# Patient Record
Sex: MALE | Race: WHITE | NOT HISPANIC OR LATINO
[De-identification: names, ages, dates, MRNs, and addresses within clinical notes are randomized per-mention and may not be internally consistent; named-entity substitution may affect disease eponyms.]

---

## 2018-04-14 ENCOUNTER — TRANSCRIPTION ENCOUNTER (OUTPATIENT)
Age: 68
End: 2018-04-14

## 2018-04-25 PROBLEM — Z00.00 ENCOUNTER FOR PREVENTIVE HEALTH EXAMINATION: Status: ACTIVE | Noted: 2018-04-25

## 2018-05-25 ENCOUNTER — APPOINTMENT (OUTPATIENT)
Dept: UROLOGY | Facility: CLINIC | Age: 68
End: 2018-05-25

## 2020-07-04 PROBLEM — F32.9 DEPRESSION: Status: ACTIVE | Noted: 2020-07-04

## 2020-07-06 ENCOUNTER — APPOINTMENT (OUTPATIENT)
Dept: HEART AND VASCULAR | Facility: CLINIC | Age: 70
End: 2020-07-06
Payer: MEDICARE

## 2020-07-06 ENCOUNTER — NON-APPOINTMENT (OUTPATIENT)
Age: 70
End: 2020-07-06

## 2020-07-06 VITALS
SYSTOLIC BLOOD PRESSURE: 144 MMHG | TEMPERATURE: 98.3 F | WEIGHT: 201 LBS | BODY MASS INDEX: 31.18 KG/M2 | HEART RATE: 71 BPM | OXYGEN SATURATION: 95 % | HEIGHT: 67.5 IN | DIASTOLIC BLOOD PRESSURE: 88 MMHG

## 2020-07-06 VITALS — DIASTOLIC BLOOD PRESSURE: 74 MMHG | SYSTOLIC BLOOD PRESSURE: 140 MMHG

## 2020-07-06 DIAGNOSIS — Z87.891 PERSONAL HISTORY OF NICOTINE DEPENDENCE: ICD-10-CM

## 2020-07-06 DIAGNOSIS — Z78.9 OTHER SPECIFIED HEALTH STATUS: ICD-10-CM

## 2020-07-06 DIAGNOSIS — F32.9 MAJOR DEPRESSIVE DISORDER, SINGLE EPISODE, UNSPECIFIED: ICD-10-CM

## 2020-07-06 PROCEDURE — 99205 OFFICE O/P NEW HI 60 MIN: CPT | Mod: 25

## 2020-07-06 PROCEDURE — 93000 ELECTROCARDIOGRAM COMPLETE: CPT

## 2020-07-06 RX ORDER — METOPROLOL SUCCINATE 25 MG/1
25 TABLET, EXTENDED RELEASE ORAL DAILY
Refills: 0 | Status: ACTIVE | COMMUNITY

## 2020-07-06 RX ORDER — LOSARTAN POTASSIUM 25 MG/1
25 TABLET, FILM COATED ORAL DAILY
Qty: 90 | Refills: 0 | Status: ACTIVE | COMMUNITY

## 2020-07-06 RX ORDER — FUROSEMIDE 40 MG/1
40 TABLET ORAL
Qty: 180 | Refills: 3 | Status: ACTIVE | COMMUNITY

## 2020-07-06 RX ORDER — GABAPENTIN 300 MG/1
300 CAPSULE ORAL 3 TIMES DAILY
Refills: 0 | Status: ACTIVE | COMMUNITY
Start: 2020-07-06

## 2020-07-06 RX ORDER — AMIODARONE HYDROCHLORIDE 200 MG/1
200 TABLET ORAL DAILY
Qty: 30 | Refills: 2 | Status: ACTIVE | COMMUNITY

## 2020-07-06 RX ORDER — APIXABAN 5 MG/1
5 TABLET, FILM COATED ORAL
Qty: 180 | Refills: 3 | Status: ACTIVE | COMMUNITY

## 2020-07-06 NOTE — HISTORY OF PRESENT ILLNESS
[FreeTextEntry1] : Mr. Norton presents for initial evaluation and management of atrial fibrillation, tachycardia induced cardiomyopathy, chronic systolic heart failure, ascending aortic aneurysm, and HTN.  He is being followed by Herrera Oliver MD and Jefe Love MD. He has a history of paroxysmal atrial fibrillation which is asymptomatic.  He has chronic systolic heart failure which is believed to be tachycardia mediated but he is not able to recall a coronary work up being done.  He presents for a second opinion regarding atrial fibrillation ablation.  He has low medical literacy and we spent a fair amount of time reviewing atrial fibrillation and cardiomyopathy.  \par \par \par

## 2020-07-06 NOTE — REVIEW OF SYSTEMS
[Negative] : Heme/Lymph [Joint Swelling] : joint swelling [Joint Pain] : joint pain [Palpitations] : palpitations [Dizziness] : dizziness [Joint Stiffness] : joint stiffness

## 2020-07-06 NOTE — ASSESSMENT
[FreeTextEntry1] : 1. Atrial fibrillation: [id number] Score 3\par     - continue systemic anticoagulation with Eliquis 5mg po bid\par     - discussed with patient avoidance of ASA and NSAIDS as well as need for bleeding surveillance.\par     \par 2. Chronic systolic heart failure ? secondary to tachycardia-induced cardiomyopathy:\par     - continue amiodarone 200mg po QD (currently in sinus rhythm)\par     - consider atrial fibrillation ablation after work up for ischemic heart disease\par     - consider changing losartan to an HF approved ARB\par     - continue Toprol XL 25mg po QD\par     - will send for a CTCA to r/o CAD\par \par 3. Ascending aorta aneurysm: 5.0cm\par    - continue beta 1 selective beta blocker, Toprol XL 25mg po QD\par    - will follow with serial imaging \par    - will send for a chest CTA

## 2020-07-06 NOTE — REASON FOR VISIT
[Initial Evaluation] : an initial evaluation of [FreeTextEntry1] : Diagnostic Tests:\par --------------------------------\par ECG:\par 07/06/20: NSR, normal ECG.  \par 05/13/20: sinus bradycardia at 58, otherwise normal.  \par -------------------------------\par Echo:\par 03/11/20: EF 40%, grade I diastolic dysfunction, dilated LA, mild MR, mild AI, ascending aorta 5cm.\par -------------------------------\par Lower extremity arteries:\par 01/10/20: sono: right mild, left peroneal 20-49%.\par -------------------------------\par Lower extremity veins:\par 10/14/19: sono: no DVT b/l, competent superficial system b/l.

## 2020-07-06 NOTE — PHYSICAL EXAM
[General Appearance - Well Developed] : well developed [General Appearance - Well Nourished] : well nourished [Normal Appearance] : normal appearance [Well Groomed] : well groomed [No Deformities] : no deformities [Normal Conjunctiva] : the conjunctiva exhibited no abnormalities [General Appearance - In No Acute Distress] : no acute distress [Eyelids - No Xanthelasma] : the eyelids demonstrated no xanthelasmas [No Oral Pallor] : no oral pallor [Normal Oral Mucosa] : normal oral mucosa [No Oral Cyanosis] : no oral cyanosis [Normal Jugular Venous V Waves Present] : normal jugular venous V waves present [Normal Jugular Venous A Waves Present] : normal jugular venous A waves present [No Jugular Venous Boykin A Waves] : no jugular venous boykin A waves [Normal Rate] : normal [Normal S2] : normal S2 [Normal S1] : normal S1 [No Murmur] : no murmurs heard [2+] : right 2+ [No Pitting Edema] : no pitting edema present [No Abnormalities] : the abdominal aorta was not enlarged and no bruit was heard [Respiration, Rhythm And Depth] : normal respiratory rhythm and effort [Exaggerated Use Of Accessory Muscles For Inspiration] : no accessory muscle use [Auscultation Breath Sounds / Voice Sounds] : lungs were clear to auscultation bilaterally [Abdomen Soft] : soft [Abdomen Tenderness] : non-tender [Abnormal Walk] : normal gait [Abdomen Mass (___ Cm)] : no abdominal mass palpated [Gait - Sufficient For Exercise Testing] : the gait was sufficient for exercise testing [Nail Clubbing] : no clubbing of the fingernails [Petechial Hemorrhages (___cm)] : no petechial hemorrhages [Cyanosis, Localized] : no localized cyanosis [Skin Color & Pigmentation] : normal skin color and pigmentation [] : no rash [No Venous Stasis] : no venous stasis [Skin Lesions] : no skin lesions [No Xanthoma] : no  xanthoma was observed [No Skin Ulcers] : no skin ulcer [Affect] : the affect was normal [Mood] : the mood was normal [Oriented To Time, Place, And Person] : oriented to person, place, and time [No Anxiety] : not feeling anxious [S4] : no S4 [S3] : no S3 [Left Carotid Bruit] : no bruit heard over the left carotid [Right Carotid Bruit] : no bruit heard over the right carotid [Left Femoral Bruit] : no bruit heard over the left femoral artery [Right Femoral Bruit] : no bruit heard over the right femoral artery

## 2020-07-10 ENCOUNTER — APPOINTMENT (OUTPATIENT)
Dept: CT IMAGING | Facility: CLINIC | Age: 70
End: 2020-07-10
Payer: MEDICARE

## 2020-07-10 ENCOUNTER — OUTPATIENT (OUTPATIENT)
Dept: OUTPATIENT SERVICES | Facility: HOSPITAL | Age: 70
LOS: 1 days | End: 2020-07-10

## 2020-07-10 PROCEDURE — 75574 CT ANGIO HRT W/3D IMAGE: CPT | Mod: 26

## 2020-07-14 ENCOUNTER — APPOINTMENT (OUTPATIENT)
Dept: HEART AND VASCULAR | Facility: CLINIC | Age: 70
End: 2020-07-14
Payer: MEDICARE

## 2020-07-14 VITALS
HEART RATE: 62 BPM | WEIGHT: 199 LBS | OXYGEN SATURATION: 96 % | DIASTOLIC BLOOD PRESSURE: 79 MMHG | TEMPERATURE: 98 F | HEIGHT: 67.5 IN | BODY MASS INDEX: 30.87 KG/M2 | SYSTOLIC BLOOD PRESSURE: 135 MMHG

## 2020-07-14 VITALS — DIASTOLIC BLOOD PRESSURE: 80 MMHG | SYSTOLIC BLOOD PRESSURE: 122 MMHG

## 2020-07-14 PROCEDURE — 99215 OFFICE O/P EST HI 40 MIN: CPT

## 2020-07-14 RX ORDER — ATORVASTATIN CALCIUM 20 MG/1
20 TABLET, FILM COATED ORAL DAILY
Qty: 90 | Refills: 3 | Status: ACTIVE | COMMUNITY
Start: 2020-07-14 | End: 1900-01-01

## 2020-07-14 NOTE — ASSESSMENT
[FreeTextEntry1] : 1. Atrial fibrillation: [id number] Score 3\par     - continue systemic anticoagulation with Eliquis 5mg po bid\par     - discussed with patient avoidance of ASA and NSAIDS as well as need for bleeding surveillance.\par     \par 2. Chronic systolic heart failure secondary to tachycardia-induced cardiomyopathy:\par     - continue amiodarone 200mg po QD (currently in sinus rhythm)\par     - consider atrial fibrillation ablation in the future\par     - consider changing losartan to an HF approved ARB (patient wishes to defer at this time)\par     - continue Toprol XL 25mg po QD\par \par 3. Ascending aorta aneurysm: 5.4cm (CTCA 07/13/20)\par    - continue beta 1 selective beta blocker, Toprol XL 25mg po QD\par    - will follow with serial imaging \par    - will enroll in thoracic aortic center with Mansoor Sandhu MD\par  \par 4. CAD: s/p 07/13/20: CTCA: CA+ 286, 62nd percentile, (LM 0, , LCX 10, RCA 0), LAD mild, D1/D2 normal, LCX/OM1/OM2/LPDA/LPL normal, RCA normal \par    - will pursue medical management\par    - will initiate statin therapy, atorvastatin 20mg po QD (possible adverse effects of new medications discussed)\par

## 2020-07-14 NOTE — REASON FOR VISIT
[Follow-Up - Clinic] : a clinic follow-up of [FreeTextEntry1] : Diagnostic Tests:\par --------------------------------\par ECG:\par 07/06/20: NSR, normal ECG.  \par 05/13/20: sinus bradycardia at 58, otherwise normal.  \par -------------------------------\par Echo:\par 03/11/20: EF 40%, grade I diastolic dysfunction, dilated LA, mild MR, mild AI, ascending aorta 5cm.\par -------------------------------\par Lower extremity arteries:\par 01/10/20: sono: right mild, left peroneal 20-49%.\par -------------------------------\par Lower extremity veins:\par 10/14/19: sono: no DVT b/l, competent superficial system b/l. \par -------------------------------\par CT: \par 07/13/20: CTCA: CA+ 286, 62nd percentile, (LM 0, , LCX 10, RCA 0), LAD mild, D1/D2 normal, LCX/OM1/OM2/LPDA/LPL normal, RCA normal, aortic root 4.5cm, ascending aorta 5.4cm.

## 2020-07-14 NOTE — HISTORY OF PRESENT ILLNESS
[FreeTextEntry1] : Mr. Norton presents for follow up and management of mild CAD, atrial fibrillation, tachycardia induced cardiomyopathy, chronic systolic heart failure, ascending aortic aneurysm, and HTN.  He is being followed by Herrera Oliver MD and Jefe Love MD. He has a history of paroxysmal atrial fibrillation which is asymptomatic.  He has chronic systolic heart failure which is believed to be tachycardia mediated but he is not able to recall a coronary work up being done.  He presents for a second opinion regarding atrial fibrillation ablation.  He has low medical literacy and we spent a fair amount of time reviewing atrial fibrillation, nonischemic cardiomyopathy, and thoracic aortic aneurysm.  On 07/13/20 he underwent a CTCA which revealed CA+ 286, 62nd percentile, (LM 0, , LCX 10, RCA 0), LAD mild, D1/D2 normal, LCX/OM1/OM2/LPDA/LPL normal, RCA normal, aortic root 4.5cm, and ascending aorta 5.4cm.  He has complaints of occasional short-lived paroxysms of "a rush" in his chest.  \par

## 2020-07-29 ENCOUNTER — APPOINTMENT (OUTPATIENT)
Dept: CARDIOTHORACIC SURGERY | Facility: CLINIC | Age: 70
End: 2020-07-29
Payer: MEDICARE

## 2020-07-29 DIAGNOSIS — M48.10 ANKYLOSING HYPEROSTOSIS [FORESTIER], SITE UNSPECIFIED: ICD-10-CM

## 2020-07-29 DIAGNOSIS — M19.90 UNSPECIFIED OSTEOARTHRITIS, UNSPECIFIED SITE: ICD-10-CM

## 2020-07-29 PROCEDURE — 99205 OFFICE O/P NEW HI 60 MIN: CPT

## 2020-07-30 VITALS
RESPIRATION RATE: 16 BRPM | SYSTOLIC BLOOD PRESSURE: 159 MMHG | BODY MASS INDEX: 30.87 KG/M2 | HEIGHT: 67.5 IN | WEIGHT: 199 LBS | OXYGEN SATURATION: 98 % | HEART RATE: 58 BPM | DIASTOLIC BLOOD PRESSURE: 74 MMHG | TEMPERATURE: 98.1 F

## 2020-07-30 PROBLEM — M19.90 ARTHRITIS: Status: ACTIVE | Noted: 2020-07-30

## 2020-07-30 PROBLEM — M48.10 DISH (DIFFUSE IDIOPATHIC SKELETAL HYPEROSTOSIS): Status: ACTIVE | Noted: 2020-07-30

## 2020-07-30 NOTE — PHYSICAL EXAM
[General Appearance - Alert] : alert [Sclera] : the sclera and conjunctiva were normal [General Appearance - In No Acute Distress] : in no acute distress [Outer Ear] : the ears and nose were normal in appearance [] : no respiratory distress [Apical Impulse] : the apical impulse was normal [2+] : left 2+ [Abdomen Soft] : soft [Cervical Lymph Nodes Enlarged Posterior Bilaterally] : posterior cervical [No CVA Tenderness] : no ~M costovertebral angle tenderness [Abnormal Walk] : normal gait [Skin Color & Pigmentation] : normal skin color and pigmentation [Deep Tendon Reflexes (DTR)] : deep tendon reflexes were 2+ and symmetric [Cranial Nerves] : cranial nerves 2-12 were intact [Oriented To Time, Place, And Person] : oriented to person, place, and time

## 2020-08-05 NOTE — PROCEDURE
[FreeTextEntry1] : \par Dr. Sandhu discussed activity restrictions with the patient, and would advise exercise at a moderate amount with no heavy lifting over one third of body weight, and avoiding heart rates that exceed 140 beats per minute. Every patient must abstain from tobacco and illicit drug use, especially stimulants such as cocaine or methamphetamine. The patient was also counseled on maintaining a healthy heart diet, and losing any excessive weight as this also put undue stress on both the aorta and entire cardiovascular system. Patient was counseled on the importance of medication adherence for blood pressure control, as hypertension is a significant risk factor associated with aortic dissections. First degree family members should be screened for bicuspid valve disease, and ascending aortic aneurysms.\par \par Patient also was advised to view our educational video prior to this visit regarding aortic pathology, lifestyle modifications, risk factors and procedures, retrieved at [email or web address].\par \par

## 2020-08-05 NOTE — HISTORY OF PRESENT ILLNESS
[FreeTextEntry1] : 70 year old male with HTN, HLD, CAD, Afib, depression, tachycardia induced myopathy, chronic systolic heart failure, ascending aortic aneurysm, presents today for an evaluation of his aortic pathology.  Patient was referred by Dr. Dennis Finkelstein. \par \par On 07/13/20 he underwent a CTCA which revealed CA+ 286, 62nd percentile, (LM 0, , LCX 10, RCA 0), LAD mild, D1/D2 normal, LCX/OM1/OM2/LPDA/LPL normal, RCA normal, aortic root 4.5cm, and ascending aorta 5.4cm. He has complaints of occasional short-lived paroxysms of "a rush" in his chest. \par \par Echo 03/11/20: EF 40%, grade I diastolic dysfunction, dilated LA, mild MR, mild AI, ascending aorta 5cm.\par \par Patient denies any fever, chills, fatigue, syncope, acute chest pain, palpitations, SOB, orthopnea, paroxysmal nocturnal dyspnea, vomiting, abdominal pain, back pain, BRBPR or swelling to legs. Patient does suffer from arthritis and multiple back issues, and uses a cane to ambulate. While ambulating 1 block, he has to stop due to pain.\par \par ***Of note, prior to establishing a relationship with Dr. Sotomayor, patient had been evaluated by Dr. Herrera Olivre and Dr. Jefe Love at [facility] for evaluation for an ablation.

## 2020-08-05 NOTE — DATA REVIEWED
[FreeTextEntry1] : \par On 07/13/20 he underwent a CTCA which revealed CA+ 286, 62nd percentile, (LM 0, , LCX 10, RCA 0), LAD mild, D1/D2 normal, LCX/OM1/OM2/LPDA/LPL normal, RCA normal, aortic root 4.5cm, and ascending aorta 5.4cm. He has complaints of occasional short-lived paroxysms of "a rush" in his chest. \par \par Echo 03/11/20: EF 40%, grade I diastolic dysfunction, dilated LA, mild MR, mild AI, ascending aorta 5cm.\par

## 2020-08-05 NOTE — END OF VISIT
[FreeTextEntry3] : I, LINDA CAIN , am scribing for and in the presence of BONITA BARNETT the following sections: History of present illness, past Medical/family/surgical/family/social history, review of systems, vital signs, physical exam and disposition.\par \par I personally performed the services described in the documentation, reviewed the documentation recorded by the scribe in my presence and it accurately and completely records my words and actions.\par \par

## 2020-08-05 NOTE — ASSESSMENT
[FreeTextEntry1] : 70 year old male with HTN, HLD, CAD, Afib, depression, tachycardia induced myopathy, chronic systolic heart failure, ascending aortic aneurysm, presents today for an evaluation of his aortic pathology. \par \par 70 year old male with known aortic aneurysm, consulted by Dr. Sandhu for aortic aneurysm repair. Dr. Sandhu reviewed the indications for surgery, and used our webpage [email or web address] to illustrate the aorta and anatomy of the heart. He discussed the indications for surgery with the patient. Those indications are the following: size greater than 5.0 cm, symptomatic aneurysms, family history of aortic dissection or aneurysm death with a size greater than 4.5 cm, other necessary cardiac procedures such as coronary artery bypass grafting or valvular disorders with an aneurysm greater than 4.5 cm, or connective tissue disorders with an aneurysm size greater than 4.5 cm. The patient meets the indications.\par \par Dr. Sandhu reviewed the CT images with the patient and discussed the case with Dr. Barragan. Dr. Sandhu feels the patient will benefit and is a candidate for an ASCENDING AORTA REPLACEMENT, LEFT ATRIAL APPENDAGE OCCLUSION WITH ABLATION.  discussed the risks, benefits and alternatives to surgery. Risks include but not limited to death, heart attack, bleeding, stroke, kidney problems,  and infection. He quoted a 2-3% operative mortality and complication risk.  All questions were addressed. However, Dr. Sandhu would like to speak to all the physicians involved in his care prior to making a surgical plan. \par \par \par Plan\par Dr. Sandhu to discuss case with Dr. Herrera Oliver and Dr. Osman Sotomayor\par STERNOTOMY, ASCENDING AORTA REPLACEMENT, GILMA OCCLUSION WITH ABLATION\par Patient will require cath, chest xray, labs, pfts, carotids, EKG \par \par

## 2020-09-20 PROBLEM — R00.0 TACHYCARDIA-INDUCED CARDIOMYOPATHY: Status: ACTIVE | Noted: 2020-07-04

## 2020-09-20 PROBLEM — E78.5 DYSLIPIDEMIA: Status: ACTIVE | Noted: 2020-07-04

## 2020-09-20 PROBLEM — I71.2 ASCENDING AORTIC ANEURYSM: Status: ACTIVE | Noted: 2020-07-04

## 2020-09-20 PROBLEM — I48.91 ATRIAL FIBRILLATION: Status: ACTIVE | Noted: 2020-07-04

## 2020-09-20 PROBLEM — I25.10 CAD (CORONARY ARTERY DISEASE): Status: ACTIVE | Noted: 2020-07-14

## 2020-09-20 PROBLEM — I50.22 CHRONIC SYSTOLIC HEART FAILURE: Status: ACTIVE | Noted: 2020-07-04

## 2020-09-20 PROBLEM — I10 HTN (HYPERTENSION): Status: ACTIVE | Noted: 2020-07-04

## 2020-09-20 NOTE — ASSESSMENT
[FreeTextEntry1] : 1. Atrial fibrillation: [id number] Score 3\par     - continue systemic anticoagulation with Eliquis 5mg po bid\par     - discussed with patient avoidance of ASA and NSAIDS as well as need for bleeding surveillance.\par     \par 2. Chronic systolic heart failure secondary to tachycardia-induced cardiomyopathy:\par     - continue amiodarone 200mg po QD (currently in sinus rhythm)\par     - consider atrial fibrillation ablation in the future\par     - consider changing losartan to an HF approved ARB (patient wishes to defer at this time)\par     - continue Toprol XL 25mg po QD\par \par 3. Ascending aorta aneurysm: 5.4cm (CTCA 07/13/20)\par    - continue beta 1 selective beta blocker, Toprol XL 25mg po QD\par    - will follow with serial imaging \par    - will enroll in thoracic aortic center with Mansoor Sandhu MD\par  \par 4. CAD: s/p 07/13/20: CTCA: CA+ 286, 62nd percentile, (LM 0, , LCX 10, RCA 0), LAD mild, D1/D2 normal, LCX/OM1/OM2/LPDA/LPL normal, RCA normal \par    - will pursue medical management\par    - will initiate statin therapy, atorvastatin 20mg po QD (possible adverse effects of new medications discussed)\par

## 2020-09-20 NOTE — REVIEW OF SYSTEMS
[Palpitations] : palpitations [Joint Pain] : joint pain [Joint Swelling] : joint swelling [Joint Stiffness] : joint stiffness [Dizziness] : dizziness [Negative] : Heme/Lymph

## 2020-09-20 NOTE — PHYSICAL EXAM
[General Appearance - Well Developed] : well developed [Normal Appearance] : normal appearance [Well Groomed] : well groomed [General Appearance - Well Nourished] : well nourished [No Deformities] : no deformities [General Appearance - In No Acute Distress] : no acute distress [Normal Conjunctiva] : the conjunctiva exhibited no abnormalities [Eyelids - No Xanthelasma] : the eyelids demonstrated no xanthelasmas [Normal Oral Mucosa] : normal oral mucosa [No Oral Pallor] : no oral pallor [No Oral Cyanosis] : no oral cyanosis [Normal Jugular Venous A Waves Present] : normal jugular venous A waves present [Normal Jugular Venous V Waves Present] : normal jugular venous V waves present [No Jugular Venous Boykin A Waves] : no jugular venous boykin A waves [Respiration, Rhythm And Depth] : normal respiratory rhythm and effort [Exaggerated Use Of Accessory Muscles For Inspiration] : no accessory muscle use [Auscultation Breath Sounds / Voice Sounds] : lungs were clear to auscultation bilaterally [Abdomen Soft] : soft [Abdomen Tenderness] : non-tender [Abdomen Mass (___ Cm)] : no abdominal mass palpated [Abnormal Walk] : normal gait [Gait - Sufficient For Exercise Testing] : the gait was sufficient for exercise testing [Nail Clubbing] : no clubbing of the fingernails [Cyanosis, Localized] : no localized cyanosis [Petechial Hemorrhages (___cm)] : no petechial hemorrhages [Skin Color & Pigmentation] : normal skin color and pigmentation [] : no rash [No Venous Stasis] : no venous stasis [Skin Lesions] : no skin lesions [No Skin Ulcers] : no skin ulcer [No Xanthoma] : no  xanthoma was observed [Oriented To Time, Place, And Person] : oriented to person, place, and time [Affect] : the affect was normal [Mood] : the mood was normal [No Anxiety] : not feeling anxious [Normal Rate] : normal [Normal S1] : normal S1 [Normal S2] : normal S2 [S3] : no S3 [S4] : no S4 [No Murmur] : no murmurs heard [Right Carotid Bruit] : no bruit heard over the right carotid [Left Carotid Bruit] : no bruit heard over the left carotid [Right Femoral Bruit] : no bruit heard over the right femoral artery [Left Femoral Bruit] : no bruit heard over the left femoral artery [2+] : left 2+ [No Abnormalities] : the abdominal aorta was not enlarged and no bruit was heard [No Pitting Edema] : no pitting edema present

## 2020-09-22 ENCOUNTER — APPOINTMENT (OUTPATIENT)
Dept: HEART AND VASCULAR | Facility: CLINIC | Age: 70
End: 2020-09-22

## 2020-09-22 DIAGNOSIS — R00.0 TACHYCARDIA, UNSPECIFIED: ICD-10-CM

## 2020-09-22 DIAGNOSIS — I71.2 THORACIC AORTIC ANEURYSM, W/OUT RUPTURE: ICD-10-CM

## 2020-09-22 DIAGNOSIS — I25.10 ATHEROSCLEROTIC HEART DISEASE OF NATIVE CORONARY ARTERY W/OUT ANGINA PECTORIS: ICD-10-CM

## 2020-09-22 DIAGNOSIS — I50.22 CHRONIC SYSTOLIC (CONGESTIVE) HEART FAILURE: ICD-10-CM

## 2020-09-22 DIAGNOSIS — I48.91 UNSPECIFIED ATRIAL FIBRILLATION: ICD-10-CM

## 2020-09-22 DIAGNOSIS — I43 TACHYCARDIA, UNSPECIFIED: ICD-10-CM

## 2020-09-22 DIAGNOSIS — E78.5 HYPERLIPIDEMIA, UNSPECIFIED: ICD-10-CM

## 2020-09-22 DIAGNOSIS — I10 ESSENTIAL (PRIMARY) HYPERTENSION: ICD-10-CM

## 2022-09-08 ENCOUNTER — APPOINTMENT (OUTPATIENT)
Dept: NEUROSURGERY | Facility: CLINIC | Age: 72
End: 2022-09-08

## 2022-09-08 VITALS
SYSTOLIC BLOOD PRESSURE: 144 MMHG | OXYGEN SATURATION: 100 % | HEIGHT: 67.5 IN | DIASTOLIC BLOOD PRESSURE: 77 MMHG | WEIGHT: 199 LBS | TEMPERATURE: 97.4 F | RESPIRATION RATE: 16 BRPM | BODY MASS INDEX: 30.87 KG/M2 | HEART RATE: 59 BPM

## 2022-09-08 PROCEDURE — 99204 OFFICE O/P NEW MOD 45 MIN: CPT

## 2022-09-22 ENCOUNTER — APPOINTMENT (OUTPATIENT)
Dept: NEUROSURGERY | Facility: CLINIC | Age: 72
End: 2022-09-22

## 2022-09-22 DIAGNOSIS — M51.26 OTHER INTERVERTEBRAL DISC DISPLACEMENT, LUMBAR REGION: ICD-10-CM

## 2022-09-22 PROCEDURE — 99443: CPT
